# Patient Record
Sex: MALE | Race: OTHER | NOT HISPANIC OR LATINO
[De-identification: names, ages, dates, MRNs, and addresses within clinical notes are randomized per-mention and may not be internally consistent; named-entity substitution may affect disease eponyms.]

---

## 2023-05-26 PROBLEM — Z00.00 ENCOUNTER FOR PREVENTIVE HEALTH EXAMINATION: Status: ACTIVE | Noted: 2023-05-26

## 2023-05-30 ENCOUNTER — APPOINTMENT (OUTPATIENT)
Dept: ORTHOPEDIC SURGERY | Facility: CLINIC | Age: 28
End: 2023-05-30
Payer: OTHER MISCELLANEOUS

## 2023-05-30 VITALS — HEIGHT: 67 IN | WEIGHT: 215 LBS | BODY MASS INDEX: 33.74 KG/M2

## 2023-05-30 DIAGNOSIS — S50.02XA CONTUSION OF LEFT ELBOW, INITIAL ENCOUNTER: ICD-10-CM

## 2023-05-30 DIAGNOSIS — S63.501A UNSPECIFIED SPRAIN OF RIGHT WRIST, INITIAL ENCOUNTER: ICD-10-CM

## 2023-05-30 DIAGNOSIS — S46.012A STRAIN OF MUSCLE(S) AND TENDON(S) OF THE ROTATOR CUFF OF LEFT SHOULDER, INITIAL ENCOUNTER: ICD-10-CM

## 2023-05-30 PROCEDURE — 73010 X-RAY EXAM OF SHOULDER BLADE: CPT | Mod: LT

## 2023-05-30 PROCEDURE — 73110 X-RAY EXAM OF WRIST: CPT | Mod: RT

## 2023-05-30 PROCEDURE — 73030 X-RAY EXAM OF SHOULDER: CPT | Mod: LT

## 2023-05-30 PROCEDURE — 99215 OFFICE O/P EST HI 40 MIN: CPT

## 2023-05-30 PROCEDURE — 73080 X-RAY EXAM OF ELBOW: CPT | Mod: LT

## 2023-05-31 NOTE — DISCUSSION/SUMMARY
[de-identified] : Reviewed the nature of this injury and prognosis with the patient\par Okay to return to work without restrictions\par NSAIDs as needed for pain\par Follow-up as needed

## 2023-05-31 NOTE — DATA REVIEWED
[FreeTextEntry1] : 2 views left shoulder: No acute fractures or malalignment\par 2 views left elbow: no acute fractures or malalignment\par 2 views left scapula:no acute fractures or malalignment\par 2 views right wrist: No acute fractures or malalignment
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
## 2023-05-31 NOTE — WORK
[Sprain/Strain] : sprain/strain [Was the competent medical cause of the injury] : was the competent medical cause of the injury [Are consistent with the injury] : are consistent with the injury [Consistent with my objective findings] : consistent with my objective findings [Does not reveal pre-existing condition(s) that may affect treatment/prognosis] : does not reveal pre-existing condition(s) that may affect treatment/prognosis [N/A] : : Not Applicable [Patient] : patient [No Rx restrictions] : No Rx restrictions. [I provided the services listed above] :  I provided the services listed above. [FreeTextEntry1] : Excellent

## 2023-05-31 NOTE — IMAGING
[de-identified] : Left upper extremity\par Minimally TTP along bicipital groove\par Full painless and symmetric shoulder ROM\par NTTP throughout elbow and wrist\par No obvious ECU snapping or subluxation\par Full painless wrist ROM\par +AIN/ PIN/ UIlnar n\par SILT throughout\par fingers wwp\par 5/5 forward flexion\par 5/5 abduction\par 5/5 external rotation at side\par - Bearhug\par - Belly press\par \par Right wrist\par No deformity\par No ecchymosis, swelling or wounds\par Minimally TTP along dorsal wrist\par No obvious ECU snapping or subluxation\par Full painless wrist ROM\par +AIN/ PIN/ UIlnar n\par SILT throughout\par fingers wwp\par - Fovea sign\par - Ballottement\par - TFCC grind\par - Kay shift\par \par \par

## 2023-09-01 ENCOUNTER — INPATIENT (INPATIENT)
Facility: HOSPITAL | Age: 28
LOS: 10 days | Discharge: HOME | DRG: 950 | End: 2023-09-12
Attending: INTERNAL MEDICINE | Admitting: INTERNAL MEDICINE
Payer: COMMERCIAL

## 2023-09-01 VITALS
SYSTOLIC BLOOD PRESSURE: 130 MMHG | WEIGHT: 220.02 LBS | HEIGHT: 67 IN | OXYGEN SATURATION: 98 % | TEMPERATURE: 98 F | RESPIRATION RATE: 16 BRPM | HEART RATE: 71 BPM | DIASTOLIC BLOOD PRESSURE: 87 MMHG

## 2023-09-01 DIAGNOSIS — Z98.890 OTHER SPECIFIED POSTPROCEDURAL STATES: Chronic | ICD-10-CM

## 2023-09-01 DIAGNOSIS — S81.032A PUNCTURE WOUND WITHOUT FOREIGN BODY, LEFT KNEE, INITIAL ENCOUNTER: ICD-10-CM

## 2023-09-01 RX ORDER — POLYETHYLENE GLYCOL 3350 17 G/17G
17 POWDER, FOR SOLUTION ORAL
Refills: 0 | Status: DISCONTINUED | OUTPATIENT
Start: 2023-09-01 | End: 2023-09-12

## 2023-09-01 RX ORDER — OXYCODONE HYDROCHLORIDE 5 MG/1
10 TABLET ORAL EVERY 4 HOURS
Refills: 0 | Status: DISCONTINUED | OUTPATIENT
Start: 2023-09-01 | End: 2023-09-05

## 2023-09-01 RX ORDER — SENNA PLUS 8.6 MG/1
2 TABLET ORAL AT BEDTIME
Refills: 0 | Status: DISCONTINUED | OUTPATIENT
Start: 2023-09-01 | End: 2023-09-12

## 2023-09-01 RX ORDER — LIDOCAINE 4 G/100G
1 CREAM TOPICAL EVERY 24 HOURS
Refills: 0 | Status: DISCONTINUED | OUTPATIENT
Start: 2023-09-01 | End: 2023-09-05

## 2023-09-01 RX ORDER — OXYCODONE HYDROCHLORIDE 5 MG/1
5 TABLET ORAL EVERY 4 HOURS
Refills: 0 | Status: DISCONTINUED | OUTPATIENT
Start: 2023-09-01 | End: 2023-09-05

## 2023-09-01 RX ORDER — ACETAMINOPHEN 500 MG
975 TABLET ORAL EVERY 6 HOURS
Refills: 0 | Status: DISCONTINUED | OUTPATIENT
Start: 2023-09-01 | End: 2023-09-12

## 2023-09-01 RX ADMIN — OXYCODONE HYDROCHLORIDE 10 MILLIGRAM(S): 5 TABLET ORAL at 21:26

## 2023-09-01 RX ADMIN — OXYCODONE HYDROCHLORIDE 10 MILLIGRAM(S): 5 TABLET ORAL at 20:26

## 2023-09-01 NOTE — H&P ADULT - SOCIAL HISTORY
Harmanshirley Frias discharged to home accompanied by wife.   Patient provided with the following educational materials upon discharge:  Respiratory Infection and Tamiflu .  Valuables and belongings sent with patient.   discharge summary, discharge instructions and medications reviewed with patient and wife.  Patient and wife verbalized understanding. IV removed by staff. Wheel chair offered.    No
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
## 2023-09-01 NOTE — H&P ADULT - NSHPPHYSICALEXAM_GEN_ALL_CORE
PHYSICAL EXAM  VITALS  T(C): 36.8 (09-01-23 @ 19:03), Max: 36.8 (09-01-23 @ 19:03)  HR: 71 (09-01-23 @ 19:03) (71 - 71)  BP: 130/87 (09-01-23 @ 19:03) (130/87 - 130/87)  RR: 16 (09-01-23 @ 19:03) (16 - 16)  SpO2: 98% (09-01-23 @ 19:03) (98% - 98%)    Gen - NAD, Comfortable  HEENT - NCAT, EOMI, MMM  Neck - Supple, No limited ROM  Pulm - CTAB, No wheeze, No rhonchi, No crackles  Cardiovascular - RRR, S1S2  Chest - good chest expansion, good respiratory effort  Abdomen - Soft, NT/ND, +BS  Extremities - No Cyanosis, no clubbing, no edema, no calf tenderness  Neuro-     Cognitive - awake, alert, oriented to person, place, date, year, and situation. Able     Communication - Fluent, Comprehensible     Attention: Intact, able to state days of week chronologically and backwards. Able to perform simple additions and subtractions     Judgement: Good evidence of judgement     Memory: Recall 3 objects immediate and 3 min later     Cranial Nerves - CN 2-12 intact. No facial asymmetry, Tongue midline, EOMI, Shoulder shrug intact     Motor -                     LEFT    UE - 5/5                    RIGHT UE -  5/5                    LEFT    LE - HF & KE- not tested, DF 4/5, PF 4/5                    RIGHT LE -  5/5        Sensory - Intact to LT     Reflexes - DTR Intact, No primitive reflexive     Coordination - FTN intact      Tone - normal  Psychiatric - Mood stable, Affect WNL  Skin: scab to b/l elbows and right knee, left knee surgical incision with prevena drain

## 2023-09-01 NOTE — H&P ADULT - NSHPSOCIALHISTORY_GEN_ALL_CORE
SOCIAL HISTORY  Smoking - Denied  EtOH - social drinker  Drugs - Denied    FUNCTIONAL HISTORY  Lives in  with family, 8 [address] with HR  Employment: [employer]  Prior Level of Function: Independent in ADLs and ambulation    CURRENT FUNCTIONAL STATUS  - Bed Mobility: min A with assist for LLE  - Transfers: min - CGA  - Gait: 25' RW min - CGA step-to pattern, reverting to hopping pattern 2/2 LLE pain  - ADLs: min-mod A
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
## 2023-09-01 NOTE — PATIENT PROFILE ADULT - CENTRAL VENOUS CATHETER/PICC LINE
Received patient at beginning of shift - alert and oriented. Patient is flat but cooperative. Patient denies suicidal and homicidal thoughts. Patient denies auditory and visual hallucinations. Patient is educated and compliant with medications.Safety precautions maintained.     COVID-19 screening has been completed and no concerns for COVID-19 at this time. Patient following instructions regarding mask usage and social distancing.    no

## 2023-09-02 LAB
ALBUMIN SERPL ELPH-MCNC: 3.4 G/DL — SIGNIFICANT CHANGE UP (ref 3.3–5)
ALP SERPL-CCNC: 80 U/L — SIGNIFICANT CHANGE UP (ref 40–120)
ALT FLD-CCNC: 41 U/L — SIGNIFICANT CHANGE UP (ref 10–45)
ANION GAP SERPL CALC-SCNC: 10 MMOL/L — SIGNIFICANT CHANGE UP (ref 5–17)
AST SERPL-CCNC: 24 U/L — SIGNIFICANT CHANGE UP (ref 10–40)
BASOPHILS # BLD AUTO: 0.03 K/UL — SIGNIFICANT CHANGE UP (ref 0–0.2)
BASOPHILS NFR BLD AUTO: 0.4 % — SIGNIFICANT CHANGE UP (ref 0–2)
BILIRUB SERPL-MCNC: 0.8 MG/DL — SIGNIFICANT CHANGE UP (ref 0.2–1.2)
BUN SERPL-MCNC: 11 MG/DL — SIGNIFICANT CHANGE UP (ref 7–23)
CALCIUM SERPL-MCNC: 9.1 MG/DL — SIGNIFICANT CHANGE UP (ref 8.4–10.5)
CHLORIDE SERPL-SCNC: 101 MMOL/L — SIGNIFICANT CHANGE UP (ref 96–108)
CO2 SERPL-SCNC: 29 MMOL/L — SIGNIFICANT CHANGE UP (ref 22–31)
CREAT SERPL-MCNC: 0.93 MG/DL — SIGNIFICANT CHANGE UP (ref 0.5–1.3)
EGFR: 116 ML/MIN/1.73M2 — SIGNIFICANT CHANGE UP
EOSINOPHIL # BLD AUTO: 0.19 K/UL — SIGNIFICANT CHANGE UP (ref 0–0.5)
EOSINOPHIL NFR BLD AUTO: 2.7 % — SIGNIFICANT CHANGE UP (ref 0–6)
GLUCOSE SERPL-MCNC: 94 MG/DL — SIGNIFICANT CHANGE UP (ref 70–99)
HCT VFR BLD CALC: 42.4 % — SIGNIFICANT CHANGE UP (ref 39–50)
HGB BLD-MCNC: 14.4 G/DL — SIGNIFICANT CHANGE UP (ref 13–17)
IMM GRANULOCYTES NFR BLD AUTO: 0.4 % — SIGNIFICANT CHANGE UP (ref 0–0.9)
LYMPHOCYTES # BLD AUTO: 1.68 K/UL — SIGNIFICANT CHANGE UP (ref 1–3.3)
LYMPHOCYTES # BLD AUTO: 23.8 % — SIGNIFICANT CHANGE UP (ref 13–44)
MCHC RBC-ENTMCNC: 31.3 PG — SIGNIFICANT CHANGE UP (ref 27–34)
MCHC RBC-ENTMCNC: 34 GM/DL — SIGNIFICANT CHANGE UP (ref 32–36)
MCV RBC AUTO: 92.2 FL — SIGNIFICANT CHANGE UP (ref 80–100)
MONOCYTES # BLD AUTO: 0.82 K/UL — SIGNIFICANT CHANGE UP (ref 0–0.9)
MONOCYTES NFR BLD AUTO: 11.6 % — SIGNIFICANT CHANGE UP (ref 2–14)
NEUTROPHILS # BLD AUTO: 4.3 K/UL — SIGNIFICANT CHANGE UP (ref 1.8–7.4)
NEUTROPHILS NFR BLD AUTO: 61.1 % — SIGNIFICANT CHANGE UP (ref 43–77)
NRBC # BLD: 0 /100 WBCS — SIGNIFICANT CHANGE UP (ref 0–0)
PLATELET # BLD AUTO: 181 K/UL — SIGNIFICANT CHANGE UP (ref 150–400)
POTASSIUM SERPL-MCNC: 3.4 MMOL/L — LOW (ref 3.5–5.3)
POTASSIUM SERPL-SCNC: 3.4 MMOL/L — LOW (ref 3.5–5.3)
PROT SERPL-MCNC: 7.4 G/DL — SIGNIFICANT CHANGE UP (ref 6–8.3)
RBC # BLD: 4.6 M/UL — SIGNIFICANT CHANGE UP (ref 4.2–5.8)
RBC # FLD: 12.3 % — SIGNIFICANT CHANGE UP (ref 10.3–14.5)
SODIUM SERPL-SCNC: 140 MMOL/L — SIGNIFICANT CHANGE UP (ref 135–145)
WBC # BLD: 7.05 K/UL — SIGNIFICANT CHANGE UP (ref 3.8–10.5)
WBC # FLD AUTO: 7.05 K/UL — SIGNIFICANT CHANGE UP (ref 3.8–10.5)

## 2023-09-02 PROCEDURE — 99223 1ST HOSP IP/OBS HIGH 75: CPT

## 2023-09-02 PROCEDURE — 93010 ELECTROCARDIOGRAM REPORT: CPT

## 2023-09-02 RX ORDER — INFLUENZA VIRUS VACCINE 15; 15; 15; 15 UG/.5ML; UG/.5ML; UG/.5ML; UG/.5ML
0.5 SUSPENSION INTRAMUSCULAR ONCE
Refills: 0 | Status: DISCONTINUED | OUTPATIENT
Start: 2023-09-02 | End: 2023-09-12

## 2023-09-02 RX ORDER — POTASSIUM CHLORIDE 20 MEQ
20 PACKET (EA) ORAL ONCE
Refills: 0 | Status: COMPLETED | OUTPATIENT
Start: 2023-09-02 | End: 2023-09-02

## 2023-09-02 RX ORDER — ZINC SULFATE TAB 220 MG (50 MG ZINC EQUIVALENT) 220 (50 ZN) MG
220 TAB ORAL DAILY
Refills: 0 | Status: DISCONTINUED | OUTPATIENT
Start: 2023-09-02 | End: 2023-09-12

## 2023-09-02 RX ORDER — ASCORBIC ACID 60 MG
500 TABLET,CHEWABLE ORAL DAILY
Refills: 0 | Status: DISCONTINUED | OUTPATIENT
Start: 2023-09-02 | End: 2023-09-12

## 2023-09-02 RX ORDER — OXYCODONE HYDROCHLORIDE 5 MG/1
10 TABLET ORAL
Refills: 0 | Status: DISCONTINUED | OUTPATIENT
Start: 2023-09-02 | End: 2023-09-05

## 2023-09-02 RX ADMIN — OXYCODONE HYDROCHLORIDE 10 MILLIGRAM(S): 5 TABLET ORAL at 22:50

## 2023-09-02 RX ADMIN — OXYCODONE HYDROCHLORIDE 5 MILLIGRAM(S): 5 TABLET ORAL at 18:45

## 2023-09-02 RX ADMIN — Medication 500 MILLIGRAM(S): at 18:45

## 2023-09-02 RX ADMIN — Medication 1 TABLET(S): at 18:45

## 2023-09-02 RX ADMIN — OXYCODONE HYDROCHLORIDE 5 MILLIGRAM(S): 5 TABLET ORAL at 14:30

## 2023-09-02 RX ADMIN — OXYCODONE HYDROCHLORIDE 10 MILLIGRAM(S): 5 TABLET ORAL at 09:45

## 2023-09-02 RX ADMIN — OXYCODONE HYDROCHLORIDE 10 MILLIGRAM(S): 5 TABLET ORAL at 23:50

## 2023-09-02 RX ADMIN — OXYCODONE HYDROCHLORIDE 5 MILLIGRAM(S): 5 TABLET ORAL at 19:30

## 2023-09-02 RX ADMIN — OXYCODONE HYDROCHLORIDE 5 MILLIGRAM(S): 5 TABLET ORAL at 13:50

## 2023-09-02 RX ADMIN — OXYCODONE HYDROCHLORIDE 10 MILLIGRAM(S): 5 TABLET ORAL at 09:05

## 2023-09-02 RX ADMIN — Medication 20 MILLIEQUIVALENT(S): at 18:45

## 2023-09-02 RX ADMIN — ZINC SULFATE TAB 220 MG (50 MG ZINC EQUIVALENT) 220 MILLIGRAM(S): 220 (50 ZN) TAB at 18:45
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
## 2023-09-02 NOTE — DIETITIAN INITIAL EVALUATION ADULT - ORAL INTAKE PTA/DIET HISTORY
PTA patient generally consumed balanced diet. Poor appetite/intake x 3days. NKFA nor food intolerances reported.

## 2023-09-02 NOTE — DIETITIAN INITIAL EVALUATION ADULT - PERTINENT LABORATORY DATA
09-02    140  |  101  |  11  ----------------------------<  94  3.4<L>   |  29  |  0.93    Ca    9.1      02 Sep 2023 07:38    TPro  7.4  /  Alb  3.4  /  TBili  0.8  /  DBili  x   /  AST  24  /  ALT  41  /  AlkPhos  80  09-02

## 2023-09-02 NOTE — DIETITIAN INITIAL EVALUATION ADULT - PERSON TAUGHT/METHOD
Optimal protein intake/verbal instruction/teach back - (Patient repeats in own words)/patient instructed

## 2023-09-02 NOTE — DIETITIAN INITIAL EVALUATION ADULT - PERTINENT MEDS FT
MEDICATIONS  (STANDING):  influenza   Vaccine 0.5 milliLiter(s) IntraMuscular once  lidocaine   4% Patch 1 Patch Transdermal every 24 hours  senna 2 Tablet(s) Oral at bedtime    MEDICATIONS  (PRN):  acetaminophen     Tablet .. 975 milliGRAM(s) Oral every 6 hours PRN Mild Pain (1 - 3)  oxyCODONE    IR 10 milliGRAM(s) Oral every 4 hours PRN Severe Pain (7 - 10)  oxyCODONE    IR 5 milliGRAM(s) Oral every 4 hours PRN Moderate Pain (4 - 6)  polyethylene glycol 3350 17 Gram(s) Oral two times a day PRN Constipation

## 2023-09-02 NOTE — DIETITIAN INITIAL EVALUATION ADULT - ADD RECOMMEND
1. Continue Regular diet.   2. Recommend Mobango 1.0 Standard 11oz PO Daily (Provides 325kcal-16grams of Protein) , +MVI,  Vit C (500 mg) & Zinc (220mg x 10 days) supplementation to aid in wound healing, when medically feasible   3. Monitor PO intake, GI tolerance, skin integrity, labs, weight, and bowel movement regularity.   4. Encourage prioritizing protein intake.  5. Provide ongoing diet education as needed  6. RD remains available upon request and will follow-up per protocol
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
## 2023-09-03 DIAGNOSIS — E87.6 HYPOKALEMIA: ICD-10-CM

## 2023-09-03 PROCEDURE — 99232 SBSQ HOSP IP/OBS MODERATE 35: CPT

## 2023-09-03 RX ORDER — ENOXAPARIN SODIUM 100 MG/ML
30 INJECTION SUBCUTANEOUS EVERY 12 HOURS
Refills: 0 | Status: DISCONTINUED | OUTPATIENT
Start: 2023-09-03 | End: 2023-09-03

## 2023-09-03 RX ORDER — ENOXAPARIN SODIUM 100 MG/ML
40 INJECTION SUBCUTANEOUS EVERY 24 HOURS
Refills: 0 | Status: DISCONTINUED | OUTPATIENT
Start: 2023-09-03 | End: 2023-09-12

## 2023-09-03 RX ADMIN — OXYCODONE HYDROCHLORIDE 10 MILLIGRAM(S): 5 TABLET ORAL at 16:13

## 2023-09-03 RX ADMIN — OXYCODONE HYDROCHLORIDE 10 MILLIGRAM(S): 5 TABLET ORAL at 21:18

## 2023-09-03 RX ADMIN — ZINC SULFATE TAB 220 MG (50 MG ZINC EQUIVALENT) 220 MILLIGRAM(S): 220 (50 ZN) TAB at 11:03

## 2023-09-03 RX ADMIN — OXYCODONE HYDROCHLORIDE 10 MILLIGRAM(S): 5 TABLET ORAL at 17:13

## 2023-09-03 RX ADMIN — OXYCODONE HYDROCHLORIDE 10 MILLIGRAM(S): 5 TABLET ORAL at 08:39

## 2023-09-03 RX ADMIN — LIDOCAINE 1 PATCH: 4 CREAM TOPICAL at 16:13

## 2023-09-03 RX ADMIN — Medication 500 MILLIGRAM(S): at 11:03

## 2023-09-03 RX ADMIN — OXYCODONE HYDROCHLORIDE 10 MILLIGRAM(S): 5 TABLET ORAL at 20:18

## 2023-09-03 RX ADMIN — OXYCODONE HYDROCHLORIDE 10 MILLIGRAM(S): 5 TABLET ORAL at 07:39

## 2023-09-03 RX ADMIN — Medication 1 TABLET(S): at 11:03

## 2023-09-03 RX ADMIN — LIDOCAINE 1 PATCH: 4 CREAM TOPICAL at 19:50

## 2023-09-03 RX ADMIN — ENOXAPARIN SODIUM 40 MILLIGRAM(S): 100 INJECTION SUBCUTANEOUS at 18:28
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
## 2023-09-03 NOTE — PROGRESS NOTE ADULT - NSPROGADDITIONALINFOA_GEN_ALL_CORE
I have personally interviewed and examined this patient, reviewed pertinent clinical information, and performed the evaluation and management services provided at today's visit for inpatient medical follow up    I am available to discuss any issues related to the medical care of this patient on the unit this morning, through [employer] Teams Chat or by phone at [phone]    Will discuss with multidisciplinary team at [facility]DR[facility] today

## 2023-09-03 NOTE — PROGRESS NOTE ADULT - SUBJECTIVE AND OBJECTIVE BOX
Patient is a 28y old  Male who presents with a chief complaint of Puncture wound without foreign body, left knee, initial encounter     (02 Sep 2023 10:29)      History, interim events and clinically pertinent issues reviewed; patient interviewed and examined.   His pain is presently controlled and he slept well.  No other complaints    ALLERGIES:  No Known Allergies    MEDICATIONS  (STANDING):  ascorbic acid 500 milliGRAM(s) Oral daily  influenza   Vaccine 0.5 milliLiter(s) IntraMuscular once  lidocaine   4% Patch 1 Patch Transdermal every 24 hours  multivitamin 1 Tablet(s) Oral daily  oxyCODONE    IR 10 milliGRAM(s) Oral <User Schedule>  senna 2 Tablet(s) Oral at bedtime  zinc sulfate 220 milliGRAM(s) Oral daily    MEDICATIONS  (PRN):  acetaminophen     Tablet .. 975 milliGRAM(s) Oral every 6 hours PRN Mild Pain (1 - 3)  oxyCODONE    IR 10 milliGRAM(s) Oral every 4 hours PRN Severe Pain (7 - 10)  oxyCODONE    IR 5 milliGRAM(s) Oral every 4 hours PRN Moderate Pain (4 - 6)  polyethylene glycol 3350 17 Gram(s) Oral two times a day PRN Constipation    Vital Signs Last 24 Hrs  T(F): 98 (03 Sep 2023 07:36), Max: 98.1 (02 Sep 2023 19:02)  HR: 56 (03 Sep 2023 07:36) (56 - 66)  BP: 116/80 (03 Sep 2023 07:36) (116/80 - 124/77)  RR: 16 (03 Sep 2023 07:36) (16 - 16)  SpO2: 97% (03 Sep 2023 07:36) (95% - 97%)  I&O's Summary    BMI (kg/m2): 34.5 (09-01-23 @ 19:03)          PHYSICAL EXAM:  General: NAD, A/O x 3  ENT: MMM  Neck: Supple, No JVD  Lungs: Clear to auscultation bilaterally  Cardio: RRR, S1/S2, No murmurs  Abdomen: Soft, Nontender, Nondistended; Bowel sounds present  Extremities: No calf tenderness, No edema; left LE wound vac in place      LABS:                        14.4   7.05  )-----------( 181      ( 02 Sep 2023 07:38 )             42.4       09-02    140  |  101  |  11  ----------------------------<  94  3.4   |  29  |  0.93    Ca    9.1      02 Sep 2023 07:38    TPro  7.4  /  Alb  3.4  /  TBili  0.8  /  DBili  x   /  AST  24  /  ALT  41  /  AlkPhos  80  09-02                            Urinalysis Basic - ( 02 Sep 2023 07:38 )    Color: x / Appearance: x / SG: x / pH: x  Gluc: 94 mg/dL / Ketone: x  / Bili: x / Urobili: x   Blood: x / Protein: x / Nitrite: x   Leuk Esterase: x / RBC: x / WBC x   Sq Epi: x / Non Sq Epi: x / Bacteria: x
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
## 2023-09-04 PROCEDURE — 99232 SBSQ HOSP IP/OBS MODERATE 35: CPT

## 2023-09-04 RX ADMIN — Medication 975 MILLIGRAM(S): at 06:03

## 2023-09-04 RX ADMIN — OXYCODONE HYDROCHLORIDE 5 MILLIGRAM(S): 5 TABLET ORAL at 12:34

## 2023-09-04 RX ADMIN — OXYCODONE HYDROCHLORIDE 10 MILLIGRAM(S): 5 TABLET ORAL at 08:37

## 2023-09-04 RX ADMIN — Medication 975 MILLIGRAM(S): at 05:03

## 2023-09-04 RX ADMIN — OXYCODONE HYDROCHLORIDE 10 MILLIGRAM(S): 5 TABLET ORAL at 23:21

## 2023-09-04 RX ADMIN — ENOXAPARIN SODIUM 40 MILLIGRAM(S): 100 INJECTION SUBCUTANEOUS at 16:47

## 2023-09-04 RX ADMIN — OXYCODONE HYDROCHLORIDE 10 MILLIGRAM(S): 5 TABLET ORAL at 07:37

## 2023-09-04 RX ADMIN — Medication 975 MILLIGRAM(S): at 19:57

## 2023-09-04 RX ADMIN — Medication 1 TABLET(S): at 11:40

## 2023-09-04 RX ADMIN — ZINC SULFATE TAB 220 MG (50 MG ZINC EQUIVALENT) 220 MILLIGRAM(S): 220 (50 ZN) TAB at 11:40

## 2023-09-04 RX ADMIN — Medication 500 MILLIGRAM(S): at 11:40

## 2023-09-04 RX ADMIN — OXYCODONE HYDROCHLORIDE 5 MILLIGRAM(S): 5 TABLET ORAL at 13:34

## 2023-09-04 RX ADMIN — Medication 975 MILLIGRAM(S): at 20:57

## 2023-09-04 RX ADMIN — OXYCODONE HYDROCHLORIDE 10 MILLIGRAM(S): 5 TABLET ORAL at 22:21

## 2023-09-04 RX ADMIN — LIDOCAINE 1 PATCH: 4 CREAM TOPICAL at 16:47

## 2023-09-04 RX ADMIN — LIDOCAINE 1 PATCH: 4 CREAM TOPICAL at 05:00

## 2023-09-04 RX ADMIN — LIDOCAINE 1 PATCH: 4 CREAM TOPICAL at 19:03

## 2023-09-04 NOTE — PROGRESS NOTE ADULT - SUBJECTIVE AND OBJECTIVE BOX
Cc: Gait dysfunction    HPI: Patient with no new medical issues overnight.  Pain controlled, no chest pain, no N/V, no Fevers/Chills. No other new ROS  Has been tolerating rehabilitation program.    acetaminophen     Tablet .. 975 milliGRAM(s) Oral every 6 hours PRN  ascorbic acid 500 milliGRAM(s) Oral daily  enoxaparin Injectable 40 milliGRAM(s) SubCutaneous every 24 hours  influenza   Vaccine 0.5 milliLiter(s) IntraMuscular once  lidocaine   4% Patch 1 Patch Transdermal every 24 hours  multivitamin 1 Tablet(s) Oral daily  oxyCODONE    IR 5 milliGRAM(s) Oral every 4 hours PRN  oxyCODONE    IR 10 milliGRAM(s) Oral <User Schedule>  oxyCODONE    IR 10 milliGRAM(s) Oral every 4 hours PRN  polyethylene glycol 3350 17 Gram(s) Oral two times a day PRN  senna 2 Tablet(s) Oral at bedtime  zinc sulfate 220 milliGRAM(s) Oral daily      T(C): 36.7 (09-04-23 @ 07:36), Max: 37.1 (09-03-23 @ 20:15)  HR: 60 (09-04-23 @ 07:36) (60 - 77)  BP: 117/81 (09-04-23 @ 07:36) (117/81 - 128/89)  RR: 16 (09-04-23 @ 07:36) (16 - 16)  SpO2: 97% (09-04-23 @ 07:36) (97% - 98%)    In NAD  HEENT- EOMI  Heart- RRR, S1S2  Lungs- CTA bl.  Abd- + BS, NT  Ext- No calf pain  Neuro- Exam unchanged        FAROM right knee with skin lesion, inferolateral

## 2023-09-04 NOTE — PROGRESS NOTE ADULT - ASSESSMENT
Imp: Patient with diagnosis of   gait dysfunction following ORIF       admitted for comprehensive acute rehabilitation.    Plan:  - Continue PT/OT/SLP  - DVT prophylaxis  - Skin- Turn q2h, check skin daily  - Continue current medications; patient medically stable.   -Active issues-   - Patient is stable to continue current rehabilitation program.  - last BM 9/3 6pm  - pain rated at 4/10 over left knee,

## 2023-09-05 LAB
ALBUMIN SERPL ELPH-MCNC: 3.7 G/DL — SIGNIFICANT CHANGE UP (ref 3.3–5)
ALP SERPL-CCNC: 85 U/L — SIGNIFICANT CHANGE UP (ref 40–120)
ALT FLD-CCNC: 73 U/L — HIGH (ref 10–45)
ANION GAP SERPL CALC-SCNC: 12 MMOL/L — SIGNIFICANT CHANGE UP (ref 5–17)
AST SERPL-CCNC: 26 U/L — SIGNIFICANT CHANGE UP (ref 10–40)
BILIRUB SERPL-MCNC: 0.4 MG/DL — SIGNIFICANT CHANGE UP (ref 0.2–1.2)
BUN SERPL-MCNC: 14 MG/DL — SIGNIFICANT CHANGE UP (ref 7–23)
CALCIUM SERPL-MCNC: 9.3 MG/DL — SIGNIFICANT CHANGE UP (ref 8.4–10.5)
CHLORIDE SERPL-SCNC: 103 MMOL/L — SIGNIFICANT CHANGE UP (ref 96–108)
CO2 SERPL-SCNC: 26 MMOL/L — SIGNIFICANT CHANGE UP (ref 22–31)
CREAT SERPL-MCNC: 0.85 MG/DL — SIGNIFICANT CHANGE UP (ref 0.5–1.3)
EGFR: 123 ML/MIN/1.73M2 — SIGNIFICANT CHANGE UP
GLUCOSE SERPL-MCNC: 102 MG/DL — HIGH (ref 70–99)
HCT VFR BLD CALC: 43.4 % — SIGNIFICANT CHANGE UP (ref 39–50)
HGB BLD-MCNC: 14.8 G/DL — SIGNIFICANT CHANGE UP (ref 13–17)
MCHC RBC-ENTMCNC: 30.9 PG — SIGNIFICANT CHANGE UP (ref 27–34)
MCHC RBC-ENTMCNC: 34.1 GM/DL — SIGNIFICANT CHANGE UP (ref 32–36)
MCV RBC AUTO: 90.6 FL — SIGNIFICANT CHANGE UP (ref 80–100)
NRBC # BLD: 0 /100 WBCS — SIGNIFICANT CHANGE UP (ref 0–0)
PLATELET # BLD AUTO: 237 K/UL — SIGNIFICANT CHANGE UP (ref 150–400)
POTASSIUM SERPL-MCNC: 4 MMOL/L — SIGNIFICANT CHANGE UP (ref 3.5–5.3)
POTASSIUM SERPL-SCNC: 4 MMOL/L — SIGNIFICANT CHANGE UP (ref 3.5–5.3)
PROT SERPL-MCNC: 8.2 G/DL — SIGNIFICANT CHANGE UP (ref 6–8.3)
RBC # BLD: 4.79 M/UL — SIGNIFICANT CHANGE UP (ref 4.2–5.8)
RBC # FLD: 12.2 % — SIGNIFICANT CHANGE UP (ref 10.3–14.5)
SODIUM SERPL-SCNC: 141 MMOL/L — SIGNIFICANT CHANGE UP (ref 135–145)
WBC # BLD: 5.97 K/UL — SIGNIFICANT CHANGE UP (ref 3.8–10.5)
WBC # FLD AUTO: 5.97 K/UL — SIGNIFICANT CHANGE UP (ref 3.8–10.5)

## 2023-09-05 PROCEDURE — 99233 SBSQ HOSP IP/OBS HIGH 50: CPT

## 2023-09-05 RX ORDER — LIDOCAINE 4 G/100G
1 CREAM TOPICAL EVERY 24 HOURS
Refills: 0 | Status: DISCONTINUED | OUTPATIENT
Start: 2023-09-05 | End: 2023-09-12

## 2023-09-05 RX ORDER — OXYCODONE HYDROCHLORIDE 5 MG/1
10 TABLET ORAL EVERY 4 HOURS
Refills: 0 | Status: DISCONTINUED | OUTPATIENT
Start: 2023-09-05 | End: 2023-09-12

## 2023-09-05 RX ORDER — OXYCODONE HYDROCHLORIDE 5 MG/1
5 TABLET ORAL EVERY 4 HOURS
Refills: 0 | Status: DISCONTINUED | OUTPATIENT
Start: 2023-09-05 | End: 2023-09-12

## 2023-09-05 RX ORDER — OXYCODONE HYDROCHLORIDE 5 MG/1
10 TABLET ORAL
Refills: 0 | Status: DISCONTINUED | OUTPATIENT
Start: 2023-09-05 | End: 2023-09-12

## 2023-09-05 RX ADMIN — ENOXAPARIN SODIUM 40 MILLIGRAM(S): 100 INJECTION SUBCUTANEOUS at 17:34

## 2023-09-05 RX ADMIN — OXYCODONE HYDROCHLORIDE 10 MILLIGRAM(S): 5 TABLET ORAL at 08:17

## 2023-09-05 RX ADMIN — OXYCODONE HYDROCHLORIDE 10 MILLIGRAM(S): 5 TABLET ORAL at 13:30

## 2023-09-05 RX ADMIN — LIDOCAINE 1 PATCH: 4 CREAM TOPICAL at 22:30

## 2023-09-05 RX ADMIN — ZINC SULFATE TAB 220 MG (50 MG ZINC EQUIVALENT) 220 MILLIGRAM(S): 220 (50 ZN) TAB at 12:56

## 2023-09-05 RX ADMIN — OXYCODONE HYDROCHLORIDE 10 MILLIGRAM(S): 5 TABLET ORAL at 22:28

## 2023-09-05 RX ADMIN — OXYCODONE HYDROCHLORIDE 10 MILLIGRAM(S): 5 TABLET ORAL at 09:00

## 2023-09-05 RX ADMIN — Medication 1 TABLET(S): at 12:56

## 2023-09-05 RX ADMIN — Medication 500 MILLIGRAM(S): at 12:56

## 2023-09-05 RX ADMIN — Medication 975 MILLIGRAM(S): at 20:50

## 2023-09-05 RX ADMIN — LIDOCAINE 1 PATCH: 4 CREAM TOPICAL at 04:08

## 2023-09-05 RX ADMIN — OXYCODONE HYDROCHLORIDE 10 MILLIGRAM(S): 5 TABLET ORAL at 23:28

## 2023-09-05 RX ADMIN — OXYCODONE HYDROCHLORIDE 10 MILLIGRAM(S): 5 TABLET ORAL at 12:55

## 2023-09-05 RX ADMIN — Medication 975 MILLIGRAM(S): at 19:50
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
## 2023-09-05 NOTE — ADVANCED PRACTICE NURSE CONSULT - ASSESSMENT
Confirmation fro surgeon never received to remove Prevena Wound VAC.  Dressing collapsed with adequate seal, VAC functioning without any alarms.  Prevena VAC may be left on for up to 7 days, patient reports it was placed on 8/31.  (VAC will shut down automatically on day 8)

## 2023-09-06 PROCEDURE — 99232 SBSQ HOSP IP/OBS MODERATE 35: CPT

## 2023-09-06 RX ADMIN — OXYCODONE HYDROCHLORIDE 10 MILLIGRAM(S): 5 TABLET ORAL at 08:32

## 2023-09-06 RX ADMIN — OXYCODONE HYDROCHLORIDE 10 MILLIGRAM(S): 5 TABLET ORAL at 23:55

## 2023-09-06 RX ADMIN — OXYCODONE HYDROCHLORIDE 5 MILLIGRAM(S): 5 TABLET ORAL at 13:45

## 2023-09-06 RX ADMIN — OXYCODONE HYDROCHLORIDE 10 MILLIGRAM(S): 5 TABLET ORAL at 22:56

## 2023-09-06 RX ADMIN — LIDOCAINE 1 PATCH: 4 CREAM TOPICAL at 07:20

## 2023-09-06 RX ADMIN — ZINC SULFATE TAB 220 MG (50 MG ZINC EQUIVALENT) 220 MILLIGRAM(S): 220 (50 ZN) TAB at 12:58

## 2023-09-06 RX ADMIN — Medication 975 MILLIGRAM(S): at 21:00

## 2023-09-06 RX ADMIN — Medication 975 MILLIGRAM(S): at 20:24

## 2023-09-06 RX ADMIN — OXYCODONE HYDROCHLORIDE 5 MILLIGRAM(S): 5 TABLET ORAL at 12:57

## 2023-09-06 RX ADMIN — LIDOCAINE 1 PATCH: 4 CREAM TOPICAL at 10:30

## 2023-09-06 RX ADMIN — OXYCODONE HYDROCHLORIDE 10 MILLIGRAM(S): 5 TABLET ORAL at 09:20

## 2023-09-06 RX ADMIN — Medication 1 TABLET(S): at 12:58

## 2023-09-06 RX ADMIN — Medication 500 MILLIGRAM(S): at 12:58

## 2023-09-06 RX ADMIN — ENOXAPARIN SODIUM 40 MILLIGRAM(S): 100 INJECTION SUBCUTANEOUS at 18:50
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
## 2023-09-07 LAB
ALBUMIN SERPL ELPH-MCNC: 3.2 G/DL — LOW (ref 3.3–5)
ALP SERPL-CCNC: 73 U/L — SIGNIFICANT CHANGE UP (ref 40–120)
ALT FLD-CCNC: 66 U/L — HIGH (ref 10–45)
ANION GAP SERPL CALC-SCNC: 7 MMOL/L — SIGNIFICANT CHANGE UP (ref 5–17)
AST SERPL-CCNC: 25 U/L — SIGNIFICANT CHANGE UP (ref 10–40)
BILIRUB SERPL-MCNC: 0.3 MG/DL — SIGNIFICANT CHANGE UP (ref 0.2–1.2)
BUN SERPL-MCNC: 13 MG/DL — SIGNIFICANT CHANGE UP (ref 7–23)
CALCIUM SERPL-MCNC: 9.2 MG/DL — SIGNIFICANT CHANGE UP (ref 8.4–10.5)
CHLORIDE SERPL-SCNC: 105 MMOL/L — SIGNIFICANT CHANGE UP (ref 96–108)
CO2 SERPL-SCNC: 29 MMOL/L — SIGNIFICANT CHANGE UP (ref 22–31)
CREAT SERPL-MCNC: 0.79 MG/DL — SIGNIFICANT CHANGE UP (ref 0.5–1.3)
EGFR: 126 ML/MIN/1.73M2 — SIGNIFICANT CHANGE UP
GLUCOSE SERPL-MCNC: 92 MG/DL — SIGNIFICANT CHANGE UP (ref 70–99)
POTASSIUM SERPL-MCNC: 3.9 MMOL/L — SIGNIFICANT CHANGE UP (ref 3.5–5.3)
POTASSIUM SERPL-SCNC: 3.9 MMOL/L — SIGNIFICANT CHANGE UP (ref 3.5–5.3)
PROT SERPL-MCNC: 7.1 G/DL — SIGNIFICANT CHANGE UP (ref 6–8.3)
SODIUM SERPL-SCNC: 141 MMOL/L — SIGNIFICANT CHANGE UP (ref 135–145)

## 2023-09-07 PROCEDURE — 99232 SBSQ HOSP IP/OBS MODERATE 35: CPT

## 2023-09-07 RX ADMIN — ENOXAPARIN SODIUM 40 MILLIGRAM(S): 100 INJECTION SUBCUTANEOUS at 17:43

## 2023-09-07 RX ADMIN — LIDOCAINE 1 PATCH: 4 CREAM TOPICAL at 22:26

## 2023-09-07 RX ADMIN — OXYCODONE HYDROCHLORIDE 10 MILLIGRAM(S): 5 TABLET ORAL at 12:24

## 2023-09-07 RX ADMIN — OXYCODONE HYDROCHLORIDE 10 MILLIGRAM(S): 5 TABLET ORAL at 23:25

## 2023-09-07 RX ADMIN — OXYCODONE HYDROCHLORIDE 5 MILLIGRAM(S): 5 TABLET ORAL at 17:41

## 2023-09-07 RX ADMIN — OXYCODONE HYDROCHLORIDE 10 MILLIGRAM(S): 5 TABLET ORAL at 08:03

## 2023-09-07 RX ADMIN — Medication 500 MILLIGRAM(S): at 12:25

## 2023-09-07 RX ADMIN — Medication 1 TABLET(S): at 12:25

## 2023-09-07 RX ADMIN — OXYCODONE HYDROCHLORIDE 10 MILLIGRAM(S): 5 TABLET ORAL at 22:25

## 2023-09-07 RX ADMIN — ZINC SULFATE TAB 220 MG (50 MG ZINC EQUIVALENT) 220 MILLIGRAM(S): 220 (50 ZN) TAB at 12:24

## 2023-09-07 RX ADMIN — OXYCODONE HYDROCHLORIDE 10 MILLIGRAM(S): 5 TABLET ORAL at 13:05

## 2023-09-07 RX ADMIN — OXYCODONE HYDROCHLORIDE 10 MILLIGRAM(S): 5 TABLET ORAL at 09:01

## 2023-09-07 RX ADMIN — OXYCODONE HYDROCHLORIDE 5 MILLIGRAM(S): 5 TABLET ORAL at 18:38
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
## 2023-09-07 NOTE — ADVANCED PRACTICE NURSE CONSULT - ASSESSMENT
Prevena VAC in place to left knee, dressing collapsed with adequate seal, VAC functioning without any alarms.  Per patient report it was placed on 8/31, VAC taken down with Rehab Team NP.  There is a well approximated surgical incision with sutures.  There is one small 'pinpoint opening' midway along incision, very scant blood with proximal palpation.  Slight fluctuance, edema noted, greater on lateral aspect of incision.  No warmth, erythema or other signs of infection noted.

## 2023-09-07 NOTE — ADVANCED PRACTICE NURSE CONSULT - RECOMMEDATIONS
Suggest daily Normal Saline Cleanse of incision, pat thoroughly dry.  May be left open to air, small 'band-aid' may be used to cover 'pinpoint' opening.     Offload all bony prominences with Turn & Position as necessary (patient with very good bed mobility)  Offload heels by floating on pillow at all times while in bed.   Nutritional support per Dietician's recommendations.     Primary RN made aware of above recs. Discussed with Rehab Team NP. No further needs/recs from Wound Care RN at this time. Staff RN to perform routine skin/wound assessment and manage wound care. Questions or concerns or if wound worsens and reconsult needed, please contact Wound Care RN 
Await further recommendations from surgeon, or alternatively, remove on day 7, (as VAC will do no harm); on Thursday 9/7.  Patient instructed to immediately alert Primary RN for any alarms/beeping or if dressing does not have collapsed appearance.   Discussed with Primary Team NP.

## 2023-09-07 NOTE — PROGRESS NOTE ADULT - SUBJECTIVE AND OBJECTIVE BOX
Patient is a 28y old  Male who presents with a chief complaint of Puncture wound without foreign body of the left knee     seen at the bedside, c/o mild pain in the knee, overall feels well. no overnight issues    Vital Signs Last 24 Hrs  T(C): 36.6 (07 Sep 2023 08:02), Max: 36.8 (06 Sep 2023 20:25)  T(F): 97.9 (07 Sep 2023 08:02), Max: 98.3 (06 Sep 2023 20:25)  HR: 59 (07 Sep 2023 08:02) (59 - 79)  BP: 114/74 (07 Sep 2023 08:02) (114/74 - 142/79)  BP(mean): --  RR: 16 (07 Sep 2023 08:02) (16 - 16)  SpO2: 97% (07 Sep 2023 08:02) (97% - 97%)    Parameters below as of 07 Sep 2023 08:02  Patient On (Oxygen Delivery Method): room air      GENERAL- NAD  EAR/NOSE/MOUTH/THROAT - MMM  EYES- HELEN, conjunctiva and Sclera clear  NECK- supple  RESPIRATORY-  clear to auscultation bilaterally, non laboured breathing  CARDIOVASCULAR - SIS2, RRR  GI - soft NT BS present  EXTREMITIES- left knee wound vacc+, swelling, ROM diminished   NEUROLOGY- no gross focal deficits  PSYCHIATRY- AAO X 3                x                    141  | 29   | 13           x     >-----------< x       ------------------------< 92                    x                    3.9  | 105  | 0.79                                         Ca 9.2   Mg x     Ph x      Urinalysis Basic - ( 07 Sep 2023 06:52 )    Color: x / Appearance: x / SG: x / pH: x  Gluc: 92 mg/dL / Ketone: x  / Bili: x / Urobili: x   Blood: x / Protein: x / Nitrite: x   Leuk Esterase: x / RBC: x / WBC x   Sq Epi: x / Non Sq Epi: x / Bacteria: x      MEDICATIONS  (STANDING):  ascorbic acid 500 milliGRAM(s) Oral daily  enoxaparin Injectable 40 milliGRAM(s) SubCutaneous every 24 hours  influenza   Vaccine 0.5 milliLiter(s) IntraMuscular once  lidocaine   4% Patch 1 Patch Transdermal every 24 hours  multivitamin 1 Tablet(s) Oral daily  oxyCODONE    IR 10 milliGRAM(s) Oral <User Schedule>  senna 2 Tablet(s) Oral at bedtime  zinc sulfate 220 milliGRAM(s) Oral daily    MEDICATIONS  (PRN):  acetaminophen     Tablet .. 975 milliGRAM(s) Oral every 6 hours PRN Mild Pain (1 - 3)  oxyCODONE    IR 5 milliGRAM(s) Oral every 4 hours PRN Moderate Pain (4 - 6)  oxyCODONE    IR 10 milliGRAM(s) Oral every 4 hours PRN Severe Pain (7 - 10)  polyethylene glycol 3350 17 Gram(s) Oral two times a day PRN Constipation
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
## 2023-09-08 ENCOUNTER — TRANSCRIPTION ENCOUNTER (OUTPATIENT)
Age: 28
End: 2023-09-08

## 2023-09-08 PROCEDURE — 99232 SBSQ HOSP IP/OBS MODERATE 35: CPT

## 2023-09-08 RX ORDER — OXYCODONE HYDROCHLORIDE 5 MG/1
1 TABLET ORAL
Qty: 21 | Refills: 0
Start: 2023-09-08 | End: 2023-09-14

## 2023-09-08 RX ORDER — SENNA PLUS 8.6 MG/1
2 TABLET ORAL
Qty: 0 | Refills: 0 | DISCHARGE
Start: 2023-09-08

## 2023-09-08 RX ORDER — ASCORBIC ACID 60 MG
1 TABLET,CHEWABLE ORAL
Qty: 0 | Refills: 0 | DISCHARGE
Start: 2023-09-08

## 2023-09-08 RX ORDER — LIDOCAINE 4 G/100G
1 CREAM TOPICAL
Qty: 0 | Refills: 0 | DISCHARGE
Start: 2023-09-08

## 2023-09-08 RX ORDER — ACETAMINOPHEN 500 MG
3 TABLET ORAL
Qty: 0 | Refills: 0 | DISCHARGE
Start: 2023-09-08

## 2023-09-08 RX ORDER — ZINC SULFATE TAB 220 MG (50 MG ZINC EQUIVALENT) 220 (50 ZN) MG
1 TAB ORAL
Qty: 0 | Refills: 0 | DISCHARGE
Start: 2023-09-08

## 2023-09-08 RX ORDER — POLYETHYLENE GLYCOL 3350 17 G/17G
17 POWDER, FOR SOLUTION ORAL
Qty: 0 | Refills: 0 | DISCHARGE
Start: 2023-09-08

## 2023-09-08 RX ADMIN — ENOXAPARIN SODIUM 40 MILLIGRAM(S): 100 INJECTION SUBCUTANEOUS at 17:05

## 2023-09-08 RX ADMIN — Medication 500 MILLIGRAM(S): at 11:38

## 2023-09-08 RX ADMIN — OXYCODONE HYDROCHLORIDE 10 MILLIGRAM(S): 5 TABLET ORAL at 23:20

## 2023-09-08 RX ADMIN — OXYCODONE HYDROCHLORIDE 10 MILLIGRAM(S): 5 TABLET ORAL at 06:11

## 2023-09-08 RX ADMIN — OXYCODONE HYDROCHLORIDE 10 MILLIGRAM(S): 5 TABLET ORAL at 12:15

## 2023-09-08 RX ADMIN — OXYCODONE HYDROCHLORIDE 10 MILLIGRAM(S): 5 TABLET ORAL at 22:21

## 2023-09-08 RX ADMIN — LIDOCAINE 1 PATCH: 4 CREAM TOPICAL at 22:21

## 2023-09-08 RX ADMIN — Medication 1 TABLET(S): at 11:38

## 2023-09-08 RX ADMIN — LIDOCAINE 1 PATCH: 4 CREAM TOPICAL at 07:34

## 2023-09-08 RX ADMIN — OXYCODONE HYDROCHLORIDE 10 MILLIGRAM(S): 5 TABLET ORAL at 07:11

## 2023-09-08 RX ADMIN — LIDOCAINE 1 PATCH: 4 CREAM TOPICAL at 10:00

## 2023-09-08 RX ADMIN — OXYCODONE HYDROCHLORIDE 10 MILLIGRAM(S): 5 TABLET ORAL at 11:37

## 2023-09-08 RX ADMIN — ZINC SULFATE TAB 220 MG (50 MG ZINC EQUIVALENT) 220 MILLIGRAM(S): 220 (50 ZN) TAB at 11:38

## 2023-09-08 NOTE — DISCHARGE NOTE PROVIDER - NSDCFUADDAPPT_GEN_ALL_CORE_FT
Please follow up with your PCP as soon as possible.    Please follow up with:   ORTHOPEDICS  [facility]  - [facility] 2nd floor Room D2-81  [address] [place] [address]  [phone]    If you are in need of a PCP or a specialist in your area: contact the [facility] Physician Referral Service at [phone].

## 2023-09-08 NOTE — DISCHARGE NOTE PROVIDER - CARE PROVIDER_API CALL
Devin Leonard  Physical/Rehab Medicine  [address] [place] [address]  Phone: [phone]  Fax: [phone]  Follow Up Time: 1 month

## 2023-09-08 NOTE — DISCHARGE NOTE PROVIDER - CARE PROVIDERS DIRECT ADDRESSES
Md in procedure in his office. Can step out if urgent. Pt stable at this time. Requested for MD to call back when finished.  Office RN states approx 30 minutes ,DirectAddress_Unknown
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
## 2023-09-08 NOTE — DISCHARGE NOTE PROVIDER - NSDCMRMEDTOKEN_GEN_ALL_CORE_FT
acetaminophen 325 mg oral tablet: 3 tab(s) orally every 6 hours As needed Mild Pain (1 - 3)  ascorbic acid 500 mg oral tablet: 1 tab(s) orally once a day  lidocaine 4% topical film: Apply topically to affected area once a day Okay to substitute OTC Salonpas patches  Multiple Vitamins oral tablet: 1 tab(s) orally once a day  oxyCODONE 10 mg oral tablet: 1 tab(s) orally every 8 hours as needed for Severe Pain (7 - 10) MDD: 3  Physical Therapy Initial Evaluation and Treatment: Physical Therapy Initial Evaluation and Treatment  2-3x/week for 8 weeks  S/P Puncture wound without foreign body  ICD-10: S91.331A  polyethylene glycol 3350 oral powder for reconstitution: 17 gram(s) orally 2 times a day As needed Constipation  senna leaf extract oral tablet: 2 tab(s) orally once a day (at bedtime)  zinc sulfate 220 mg oral capsule: 1 cap(s) orally once a day

## 2023-09-09 PROCEDURE — 99231 SBSQ HOSP IP/OBS SF/LOW 25: CPT

## 2023-09-09 RX ADMIN — ENOXAPARIN SODIUM 40 MILLIGRAM(S): 100 INJECTION SUBCUTANEOUS at 17:07

## 2023-09-09 RX ADMIN — OXYCODONE HYDROCHLORIDE 5 MILLIGRAM(S): 5 TABLET ORAL at 13:20

## 2023-09-09 RX ADMIN — OXYCODONE HYDROCHLORIDE 5 MILLIGRAM(S): 5 TABLET ORAL at 12:40

## 2023-09-09 RX ADMIN — LIDOCAINE 1 PATCH: 4 CREAM TOPICAL at 10:32

## 2023-09-09 RX ADMIN — LIDOCAINE 1 PATCH: 4 CREAM TOPICAL at 07:32

## 2023-09-09 RX ADMIN — LIDOCAINE 1 PATCH: 4 CREAM TOPICAL at 22:26

## 2023-09-09 RX ADMIN — Medication 500 MILLIGRAM(S): at 12:41

## 2023-09-09 RX ADMIN — ZINC SULFATE TAB 220 MG (50 MG ZINC EQUIVALENT) 220 MILLIGRAM(S): 220 (50 ZN) TAB at 12:41

## 2023-09-09 RX ADMIN — Medication 1 TABLET(S): at 12:40

## 2023-09-09 RX ADMIN — OXYCODONE HYDROCHLORIDE 10 MILLIGRAM(S): 5 TABLET ORAL at 23:26

## 2023-09-09 RX ADMIN — OXYCODONE HYDROCHLORIDE 10 MILLIGRAM(S): 5 TABLET ORAL at 22:26
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
## 2023-09-10 PROCEDURE — 99231 SBSQ HOSP IP/OBS SF/LOW 25: CPT

## 2023-09-10 RX ADMIN — OXYCODONE HYDROCHLORIDE 10 MILLIGRAM(S): 5 TABLET ORAL at 22:21

## 2023-09-10 RX ADMIN — Medication 1 TABLET(S): at 12:50

## 2023-09-10 RX ADMIN — OXYCODONE HYDROCHLORIDE 10 MILLIGRAM(S): 5 TABLET ORAL at 21:47

## 2023-09-10 RX ADMIN — ENOXAPARIN SODIUM 40 MILLIGRAM(S): 100 INJECTION SUBCUTANEOUS at 17:38

## 2023-09-10 RX ADMIN — Medication 500 MILLIGRAM(S): at 12:50

## 2023-09-10 RX ADMIN — LIDOCAINE 1 PATCH: 4 CREAM TOPICAL at 21:47

## 2023-09-10 RX ADMIN — ZINC SULFATE TAB 220 MG (50 MG ZINC EQUIVALENT) 220 MILLIGRAM(S): 220 (50 ZN) TAB at 12:50
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
## 2023-09-11 ENCOUNTER — TRANSCRIPTION ENCOUNTER (OUTPATIENT)
Age: 28
End: 2023-09-11

## 2023-09-11 LAB
ALBUMIN SERPL ELPH-MCNC: 3.5 G/DL — SIGNIFICANT CHANGE UP (ref 3.3–5)
ALP SERPL-CCNC: 77 U/L — SIGNIFICANT CHANGE UP (ref 40–120)
ALT FLD-CCNC: 86 U/L — HIGH (ref 10–45)
ANION GAP SERPL CALC-SCNC: 9 MMOL/L — SIGNIFICANT CHANGE UP (ref 5–17)
AST SERPL-CCNC: 26 U/L — SIGNIFICANT CHANGE UP (ref 10–40)
BILIRUB SERPL-MCNC: 0.3 MG/DL — SIGNIFICANT CHANGE UP (ref 0.2–1.2)
BUN SERPL-MCNC: 13 MG/DL — SIGNIFICANT CHANGE UP (ref 7–23)
CALCIUM SERPL-MCNC: 9.1 MG/DL — SIGNIFICANT CHANGE UP (ref 8.4–10.5)
CHLORIDE SERPL-SCNC: 104 MMOL/L — SIGNIFICANT CHANGE UP (ref 96–108)
CO2 SERPL-SCNC: 27 MMOL/L — SIGNIFICANT CHANGE UP (ref 22–31)
CREAT SERPL-MCNC: 0.78 MG/DL — SIGNIFICANT CHANGE UP (ref 0.5–1.3)
EGFR: 126 ML/MIN/1.73M2 — SIGNIFICANT CHANGE UP
GLUCOSE SERPL-MCNC: 85 MG/DL — SIGNIFICANT CHANGE UP (ref 70–99)
POTASSIUM SERPL-MCNC: 3.9 MMOL/L — SIGNIFICANT CHANGE UP (ref 3.5–5.3)
POTASSIUM SERPL-SCNC: 3.9 MMOL/L — SIGNIFICANT CHANGE UP (ref 3.5–5.3)
PROT SERPL-MCNC: 7.8 G/DL — SIGNIFICANT CHANGE UP (ref 6–8.3)
SODIUM SERPL-SCNC: 140 MMOL/L — SIGNIFICANT CHANGE UP (ref 135–145)

## 2023-09-11 PROCEDURE — 99232 SBSQ HOSP IP/OBS MODERATE 35: CPT

## 2023-09-11 RX ADMIN — OXYCODONE HYDROCHLORIDE 10 MILLIGRAM(S): 5 TABLET ORAL at 07:00

## 2023-09-11 RX ADMIN — Medication 500 MILLIGRAM(S): at 11:53

## 2023-09-11 RX ADMIN — OXYCODONE HYDROCHLORIDE 10 MILLIGRAM(S): 5 TABLET ORAL at 07:45

## 2023-09-11 RX ADMIN — LIDOCAINE 1 PATCH: 4 CREAM TOPICAL at 10:09

## 2023-09-11 RX ADMIN — OXYCODONE HYDROCHLORIDE 5 MILLIGRAM(S): 5 TABLET ORAL at 12:40

## 2023-09-11 RX ADMIN — ZINC SULFATE TAB 220 MG (50 MG ZINC EQUIVALENT) 220 MILLIGRAM(S): 220 (50 ZN) TAB at 11:52

## 2023-09-11 RX ADMIN — OXYCODONE HYDROCHLORIDE 5 MILLIGRAM(S): 5 TABLET ORAL at 11:53

## 2023-09-11 RX ADMIN — Medication 1 TABLET(S): at 11:53

## 2023-09-11 RX ADMIN — OXYCODONE HYDROCHLORIDE 10 MILLIGRAM(S): 5 TABLET ORAL at 22:09

## 2023-09-11 RX ADMIN — LIDOCAINE 1 PATCH: 4 CREAM TOPICAL at 09:09

## 2023-09-11 RX ADMIN — ENOXAPARIN SODIUM 40 MILLIGRAM(S): 100 INJECTION SUBCUTANEOUS at 17:21

## 2023-09-11 RX ADMIN — LIDOCAINE 1 PATCH: 4 CREAM TOPICAL at 22:09

## 2023-09-11 RX ADMIN — OXYCODONE HYDROCHLORIDE 10 MILLIGRAM(S): 5 TABLET ORAL at 23:09

## 2023-09-11 NOTE — DISCHARGE NOTE NURSING/CASE MANAGEMENT/SOCIAL WORK - NSDCFUADDAPPT_GEN_ALL_CORE_FT
Please follow up with your PCP as soon as possible.    Please schedule an outpatient PT evaluation at your choice of facility and bring your script with you to your initial appointment.  A list of [facility] facilities were provided to you.    Please follow up with:   ORTHOPEDICS  [facility]  - [facility] 2nd floor Room D2-81  [address] [place] [id number]  [phone]    If you are in need of a PCP or a specialist in your area: contact the [facility] Physician Referral Service at [phone].
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
## 2023-09-11 NOTE — DISCHARGE NOTE NURSING/CASE MANAGEMENT/SOCIAL WORK - PATIENT PORTAL LINK FT
You can access the FollowMyHealth Patient Portal offered by [facility] by registering at the following website: [email or web address]. By joining [employer]’s FollowMyHealth portal, you will also be able to view your health information using other applications (apps) compatible with our system.

## 2023-09-12 VITALS
DIASTOLIC BLOOD PRESSURE: 84 MMHG | RESPIRATION RATE: 16 BRPM | SYSTOLIC BLOOD PRESSURE: 132 MMHG | HEART RATE: 76 BPM | OXYGEN SATURATION: 97 %

## 2023-09-12 PROCEDURE — 80053 COMPREHEN METABOLIC PANEL: CPT

## 2023-09-12 PROCEDURE — 97535 SELF CARE MNGMENT TRAINING: CPT

## 2023-09-12 PROCEDURE — 97163 PT EVAL HIGH COMPLEX 45 MIN: CPT

## 2023-09-12 PROCEDURE — 85025 COMPLETE CBC W/AUTO DIFF WBC: CPT

## 2023-09-12 PROCEDURE — 97167 OT EVAL HIGH COMPLEX 60 MIN: CPT

## 2023-09-12 PROCEDURE — 97116 GAIT TRAINING THERAPY: CPT

## 2023-09-12 PROCEDURE — 97530 THERAPEUTIC ACTIVITIES: CPT

## 2023-09-12 PROCEDURE — 85027 COMPLETE CBC AUTOMATED: CPT

## 2023-09-12 PROCEDURE — 36415 COLL VENOUS BLD VENIPUNCTURE: CPT

## 2023-09-12 PROCEDURE — 93005 ELECTROCARDIOGRAM TRACING: CPT

## 2023-09-12 PROCEDURE — 97112 NEUROMUSCULAR REEDUCATION: CPT

## 2023-09-12 PROCEDURE — 97110 THERAPEUTIC EXERCISES: CPT

## 2023-09-12 PROCEDURE — 99239 HOSP IP/OBS DSCHRG MGMT >30: CPT

## 2023-09-12 RX ADMIN — LIDOCAINE 1 PATCH: 4 CREAM TOPICAL at 07:00

## 2023-09-12 RX ADMIN — ZINC SULFATE TAB 220 MG (50 MG ZINC EQUIVALENT) 220 MILLIGRAM(S): 220 (50 ZN) TAB at 11:11

## 2023-09-12 RX ADMIN — LIDOCAINE 1 PATCH: 4 CREAM TOPICAL at 10:00

## 2023-09-12 RX ADMIN — OXYCODONE HYDROCHLORIDE 10 MILLIGRAM(S): 5 TABLET ORAL at 11:11

## 2023-09-12 RX ADMIN — Medication 500 MILLIGRAM(S): at 11:11

## 2023-09-12 RX ADMIN — Medication 1 TABLET(S): at 11:11

## 2023-09-12 RX ADMIN — OXYCODONE HYDROCHLORIDE 10 MILLIGRAM(S): 5 TABLET ORAL at 11:50
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
## 2023-09-12 NOTE — PROGRESS NOTE ADULT - PROVIDER SPECIALTY LIST ADULT
Hospitalist
Hospitalist
Rehab Medicine
Hospitalist
Physiatry
Rehab Medicine

## 2023-09-12 NOTE — PROGRESS NOTE ADULT - NS ATTEND AMEND GEN_ALL_CORE FT
Seen and examined  Findings as noted  Note revised    Patient reports good pain control    Engaging in therapy  Left ant knee VAC in situ    Labs unremarkable      Continue therapy  Collateral from ortho team as noted  Await response on any possible ROM limitation  If no response, we will consult our ortho team for recs on ROM  Wound care f/u for left leg vac    Non critical care  Time based billing   Spent 62 mins patient review, liaison with [facility], liaison with family and care co ordination
Seen and examined, note revised    Clinically stable  Left knee pain controlled   Left lateral knee mild swelling reducing   Sutures open to air      Labs unremarkable     Continue therapy  Continue pain management   DVT ppx lovenox
Seen and examined, findings as noted  No med complaint     Slept well    Clinically stable   Left knee surgical area--sutures removed yesterday, edges together  No erythema    D/C home today  F/u with his ortho surgeon
Seen and examined  note revised    Reports good pain control  Observed ambulating with axillary crutch    Wound care consult review and recs appreciated  Rt leg VAC in situ    Continue therapy   Wound vac review/removal 9/7  Continue pain control
Seen and examined with NP and Med student     No interval med complaint   Reports normal sleep  Good engagement in therapy    Functional details as noted    Labs unremarkable    Called patient's ortho clinic, but I was unable to reach anyone at this time  Continue therapy   DC tomorrow   F/u with orthopedist at [facility]
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
## 2023-09-14 RX ORDER — ZINC SULFATE TAB 220 MG (50 MG ZINC EQUIVALENT) 220 (50 ZN) MG
1 TAB ORAL
Qty: 30 | Refills: 0
Start: 2023-09-14 | End: 2023-10-13

## 2023-10-13 ENCOUNTER — APPOINTMENT (OUTPATIENT)
Dept: PHYSICAL MEDICINE AND REHAB | Facility: CLINIC | Age: 28
End: 2023-10-13

## 2023-11-17 ENCOUNTER — APPOINTMENT (OUTPATIENT)
Dept: PHYSICAL MEDICINE AND REHAB | Facility: CLINIC | Age: 28
End: 2023-11-17